# Patient Record
Sex: MALE | Race: WHITE | NOT HISPANIC OR LATINO | Employment: OTHER | URBAN - METROPOLITAN AREA
[De-identification: names, ages, dates, MRNs, and addresses within clinical notes are randomized per-mention and may not be internally consistent; named-entity substitution may affect disease eponyms.]

---

## 2018-04-24 RX ORDER — OXYCODONE AND ACETAMINOPHEN 5; 325 MG/1; MG/1
TABLET ORAL
COMMUNITY
End: 2019-01-08 | Stop reason: SDUPTHER

## 2018-04-24 RX ORDER — CARVEDILOL 6.25 MG/1
TABLET ORAL
COMMUNITY
End: 2019-01-08 | Stop reason: SDUPTHER

## 2018-04-24 RX ORDER — WARFARIN 4 MG/1
TABLET ORAL
COMMUNITY
End: 2019-01-08 | Stop reason: SDUPTHER

## 2018-04-24 RX ORDER — AMIODARONE HYDROCHLORIDE 100 MG/1
TABLET ORAL
COMMUNITY

## 2018-04-24 RX ORDER — AMIODARONE HYDROCHLORIDE 100 MG/1
TABLET ORAL
COMMUNITY
End: 2019-01-08 | Stop reason: SDUPTHER

## 2018-04-24 RX ORDER — ATORVASTATIN CALCIUM 20 MG/1
TABLET, FILM COATED ORAL
COMMUNITY
Start: 2018-03-26

## 2018-04-24 RX ORDER — AMIODARONE HYDROCHLORIDE 400 MG/1
TABLET ORAL
COMMUNITY

## 2018-04-24 RX ORDER — ALLOPURINOL 100 MG/1
TABLET ORAL
COMMUNITY
End: 2019-01-08 | Stop reason: SDUPTHER

## 2018-04-24 RX ORDER — AMIODARONE HYDROCHLORIDE 200 MG/1
TABLET ORAL
COMMUNITY
End: 2019-01-08 | Stop reason: SDUPTHER

## 2018-04-24 RX ORDER — WARFARIN 4 MG/1
TABLET ORAL
COMMUNITY

## 2018-04-24 RX ORDER — CARVEDILOL 12.5 MG/1
TABLET ORAL
COMMUNITY
End: 2019-01-08 | Stop reason: SDUPTHER

## 2018-04-24 RX ORDER — ALBUTEROL SULFATE 90 UG/1
AEROSOL, METERED RESPIRATORY (INHALATION)
COMMUNITY
Start: 2018-04-18

## 2018-04-24 RX ORDER — WARFARIN 1 MG/1
TABLET ORAL
COMMUNITY

## 2018-04-24 RX ORDER — CALCIUM ACETATE 667 MG/1
CAPSULE ORAL
COMMUNITY

## 2018-04-24 RX ORDER — HYDROXYZINE PAMOATE 25 MG/1
CAPSULE ORAL
COMMUNITY
End: 2019-01-08 | Stop reason: SDUPTHER

## 2018-04-24 RX ORDER — BUDESONIDE AND FORMOTEROL FUMARATE DIHYDRATE 160; 4.5 UG/1; UG/1
AEROSOL RESPIRATORY (INHALATION)
COMMUNITY
Start: 2018-03-31

## 2018-04-24 RX ORDER — ATORVASTATIN CALCIUM 40 MG/1
TABLET, FILM COATED ORAL
COMMUNITY
End: 2019-01-08 | Stop reason: SDUPTHER

## 2018-04-24 RX ORDER — WARFARIN SODIUM 5 MG/1
TABLET ORAL
COMMUNITY
End: 2019-01-08 | Stop reason: SDUPTHER

## 2018-04-24 RX ORDER — CARVEDILOL 3.12 MG/1
TABLET ORAL
COMMUNITY

## 2018-04-24 RX ORDER — WARFARIN 2 MG/1
TABLET ORAL
COMMUNITY
End: 2019-01-08 | Stop reason: SDUPTHER

## 2018-04-24 RX ORDER — PANTOPRAZOLE SODIUM 40 MG/1
TABLET, DELAYED RELEASE ORAL
COMMUNITY
End: 2019-01-08 | Stop reason: SDUPTHER

## 2018-04-24 RX ORDER — AZITHROMYCIN 250 MG/1
TABLET, FILM COATED ORAL
COMMUNITY
End: 2019-01-08 | Stop reason: SDUPTHER

## 2018-04-24 RX ORDER — CARVEDILOL 3.12 MG/1
TABLET ORAL
COMMUNITY
End: 2019-01-08 | Stop reason: SDUPTHER

## 2018-04-24 RX ORDER — AMIODARONE HYDROCHLORIDE 200 MG/1
TABLET ORAL
COMMUNITY
Start: 2018-02-03 | End: 2019-01-08 | Stop reason: SDUPTHER

## 2018-04-24 RX ORDER — MIDODRINE HYDROCHLORIDE 10 MG/1
TABLET ORAL
COMMUNITY

## 2018-04-24 RX ORDER — ATORVASTATIN CALCIUM 20 MG/1
TABLET, FILM COATED ORAL
COMMUNITY
End: 2019-01-08 | Stop reason: SDUPTHER

## 2018-04-24 RX ORDER — ALBUTEROL SULFATE 0.83 MG/ML
SOLUTION RESPIRATORY (INHALATION)
COMMUNITY
End: 2019-01-08 | Stop reason: SDUPTHER

## 2018-04-24 RX ORDER — WARFARIN 2.5 MG/1
TABLET ORAL
COMMUNITY

## 2018-04-24 RX ORDER — OXYCODONE AND ACETAMINOPHEN 7.5; 325 MG/1; MG/1
TABLET ORAL
COMMUNITY

## 2018-05-15 ENCOUNTER — OFFICE VISIT (OUTPATIENT)
Dept: PODIATRY | Facility: CLINIC | Age: 68
End: 2018-05-15
Payer: MEDICARE

## 2018-05-15 VITALS
WEIGHT: 177 LBS | HEART RATE: 99 BPM | DIASTOLIC BLOOD PRESSURE: 47 MMHG | SYSTOLIC BLOOD PRESSURE: 102 MMHG | TEMPERATURE: 98 F | HEIGHT: 70 IN | BODY MASS INDEX: 25.34 KG/M2

## 2018-05-15 DIAGNOSIS — D36.10 NEUROMA: ICD-10-CM

## 2018-05-15 DIAGNOSIS — M21.70 ACQUIRED SHORT LEG SYNDROME ON LEFT: ICD-10-CM

## 2018-05-15 DIAGNOSIS — M19.079 DJD (DEGENERATIVE JOINT DISEASE), ANKLE AND FOOT, UNSPECIFIED LATERALITY: Primary | ICD-10-CM

## 2018-05-15 PROCEDURE — 99213 OFFICE O/P EST LOW 20 MIN: CPT | Mod: S$PBB,,, | Performed by: PODIATRIST

## 2018-05-15 PROCEDURE — 99213 OFFICE O/P EST LOW 20 MIN: CPT | Mod: PBBFAC,PN | Performed by: PODIATRIST

## 2018-05-15 PROCEDURE — 99999 PR PBB SHADOW E&M-EST. PATIENT-LVL III: CPT | Mod: PBBFAC,,, | Performed by: PODIATRIST

## 2018-05-19 PROBLEM — M21.70 ACQUIRED SHORT LEG SYNDROME ON LEFT: Status: ACTIVE | Noted: 2018-05-19

## 2018-05-19 PROBLEM — D36.10 NEUROMA: Status: ACTIVE | Noted: 2018-05-19

## 2018-05-19 PROBLEM — M19.079: Status: ACTIVE | Noted: 2018-05-19

## 2018-05-19 NOTE — PROGRESS NOTES
Subjective:       Patient ID: Aditya King is a 67 y.o. male.    Chief Complaint: Follow-up and Nail Problem    HPI The patient presents for followup of diffuse degenerative arthritis as well as severe pes planus bilateral. Patient states the pads that I placed in his shoes have helped quite a bit to have reduced his discomfort he states that he just recently started to use [facility] he's only been using it for a couple days but he thinks it is working. Patient has several ingrowing toenails that have become painful and is concerned about possible infection. patient's family is also concerned because the patient has dry blood under several of the toenails. Patient relates pain in both of his forefeet also. patient states he should have come back sooner but he has been dealing with health related issues.     Review of Systems   Respiratory: Positive for shortness of breath.    Musculoskeletal: Positive for arthralgias, gait problem and joint swelling.       Objective:      Physical Exam   Constitutional: He appears well-developed and well-nourished.   Cardiovascular:   Pulses:       Dorsalis pedis pulses are 1+ on the right side, and 1+ on the left side.        Posterior tibial pulses are 0 on the right side, and 0 on the left side.   Musculoskeletal: He exhibits tenderness and deformity.        Left foot: There is decreased range of motion and deformity.   Feet:   Right Foot:   Protective Sensation: 4 sites tested. 3 sites sensed.   Skin Integrity: Positive for callus and dry skin.   Left Foot:   Protective Sensation: 4 sites tested. 3 sites sensed.   Skin Integrity: Positive for callus and dry skin.   Neurological: He is alert.   Skin: Skin is warm.   Psychiatric: He has a normal mood and affect. His behavior is normal. Judgment and thought content normal.     On evaluation patient is noted to have significant vascular compromise in both lower extremities the patient's feet have a  purplish blue hue when placed in a dependent position bilateral. Patient has a generalized pain in the midfoot area especially surrounding the first MPJ bilateral this area of the first MPJ and first web space is noted to be significantly painful upon palpation there are degenerative changes noted and positive crepitus upon range of motion appreciated. Patient has no skin breaks no active signs of infection noted. Patient displayed severe pes planus deformity bilateral with medial column collapse noted upon weight-bearing bilateral.the patient has several areas of concern with likely ingrowing toenails that are at risk for becoming infected especially the bilateral hallux.patient is noted to have a significant limb length discrepancy the patient's left lower extremity is approximately half an inch shorter than the right.     Assessment:       1. DJD (degenerative joint disease), ankle and foot, unspecified laterality    2. Acquired short leg syndrome on left    3. Neuroma        Plan:       Following evaluation I have added an additional quarter-inch adhesive felt to the plantar aspect of the patient's insole on the left to make up for a limb length discrepancy.patient now has a total half an inch lift on the left side he quarter-inch I placed him there has become compressed I feel the patient having a total half and it should make a considerable difference I did advise the patient this is working well for him I can using more from material to place on the patient's insole possibly corex.patient had ingrown toenails bilaterally for debridement and removed the patient patient noted considerable relief following debridement. Patient was dispensed a new left left sign utilizing a more permanent material. Patient was dispensed a loop was passed to use between the digits primarily the second and third digits bilateral with the patient's been experiencing discomfort and rubbing between the toes. Followup as needed.   Patient dispensed a Korex quarter inch lift in the left shoe.

## 2018-08-16 ENCOUNTER — OFFICE VISIT (OUTPATIENT)
Dept: PODIATRY | Facility: CLINIC | Age: 68
End: 2018-08-16
Payer: MEDICARE

## 2018-08-16 VITALS
HEART RATE: 80 BPM | DIASTOLIC BLOOD PRESSURE: 52 MMHG | BODY MASS INDEX: 23.8 KG/M2 | HEIGHT: 71 IN | SYSTOLIC BLOOD PRESSURE: 111 MMHG | TEMPERATURE: 97 F | WEIGHT: 170 LBS

## 2018-08-16 DIAGNOSIS — L60.0 INGROWN NAIL: ICD-10-CM

## 2018-08-16 DIAGNOSIS — M21.70 ACQUIRED SHORT LEG SYNDROME ON LEFT: ICD-10-CM

## 2018-08-16 DIAGNOSIS — D36.10 NEUROMA: ICD-10-CM

## 2018-08-16 DIAGNOSIS — M19.079 DJD (DEGENERATIVE JOINT DISEASE), ANKLE AND FOOT, UNSPECIFIED LATERALITY: Primary | ICD-10-CM

## 2018-08-16 PROCEDURE — 99213 OFFICE O/P EST LOW 20 MIN: CPT | Mod: PBBFAC,PN | Performed by: PODIATRIST

## 2018-08-16 PROCEDURE — 99999 PR PBB SHADOW E&M-EST. PATIENT-LVL III: CPT | Mod: PBBFAC,,, | Performed by: PODIATRIST

## 2018-08-16 PROCEDURE — 99213 OFFICE O/P EST LOW 20 MIN: CPT | Mod: S$PBB,,, | Performed by: PODIATRIST

## 2018-08-19 PROBLEM — L60.0 INGROWN NAIL: Status: ACTIVE | Noted: 2018-08-19

## 2018-08-19 NOTE — PROGRESS NOTES
Subjective:       Patient ID: Aditya King is a 67 y.o. male.    Chief Complaint: Follow-up and Nail Problem    Nail Problem   Associated symptoms include arthralgias and joint swelling.    The patient presents for followup of diffuse degenerative arthritis as well as severe pes planus bilateral. Patient states the pads that I placed in his shoes have helped quite a bit to have reduced his discomfort he states that he just recently started to use [facility] he's only been using it for a couple days but he thinks it is working. Patient has several ingrowing toenails that have become painful and is concerned about possible infection. patient's family is also concerned because the patient has dry blood under several of the toenails. Patient relates pain in both of his forefeet also. patient states he should have come back sooner but he has been dealing with health related issues.     Review of Systems   Respiratory: Positive for shortness of breath.    Musculoskeletal: Positive for arthralgias, gait problem and joint swelling.       Objective:      Physical Exam   Constitutional: He appears well-developed and well-nourished.   Cardiovascular:   Pulses:       Dorsalis pedis pulses are 1+ on the right side, and 1+ on the left side.        Posterior tibial pulses are 0 on the right side, and 0 on the left side.   Musculoskeletal: He exhibits tenderness and deformity.        Left foot: There is decreased range of motion and deformity.   Feet:   Right Foot:   Protective Sensation: 4 sites tested. 3 sites sensed.   Skin Integrity: Positive for callus and dry skin.   Left Foot:   Protective Sensation: 4 sites tested. 3 sites sensed.   Skin Integrity: Positive for callus and dry skin.   Neurological: He is alert.   Skin: Skin is warm.   Psychiatric: He has a normal mood and affect. His behavior is normal. Judgment and thought content normal.     On evaluation patient is noted to have significant  vascular compromise in both lower extremities the patient's feet have a purplish blue hue when placed in a dependent position bilateral. Patient has a generalized pain in the midfoot area especially surrounding the first MPJ bilateral this area of the first MPJ and first web space is noted to be significantly painful upon palpation there are degenerative changes noted and positive crepitus upon range of motion appreciated. Patient has no skin breaks no active signs of infection noted. Patient displayed severe pes planus deformity bilateral with medial column collapse noted upon weight-bearing bilateral.the patient has several areas of concern with likely ingrowing toenails that are at risk for becoming infected especially the bilateral hallux.patient is noted to have a significant limb length discrepancy the patient's left lower extremity is approximately half an inch shorter than the right.     Assessment:       1. DJD (degenerative joint disease), ankle and foot, unspecified laterality    2. Acquired short leg syndrome on left    3. Neuroma    4. Ingrown nail        Plan:       Following evaluation I have added an additional quarter-inch adhesive felt to the plantar aspect of the patient's insole on the left to make up for a limb length discrepancy.patient now has a total half an inch lift on the left side he quarter-inch I placed him there has become compressed I feel the patient having a total half and it should make a considerable difference I did advise the patient this is working well for him I can using more from material to place on the patient's insole possibly corex.patient had ingrown toenails bilaterally for debridement and removed the patient patient noted considerable relief following debridement. Patient was dispensed a new left left sign utilizing a more permanent material. Patient was dispensed a loop was passed to use between the digits primarily the second and third digits bilateral with the  patient's been experiencing discomfort and rubbing between the toes. Followup as needed.  Patient dispensed a Korex quarter inch lift in the left shoe.   Patient had a significantly ingrown toenail medial lateral border left hallux this was carefully debrided today which gave the patient considerable relief this needs to be monitored very closely the patient is on dialysis and is at risk for complication due to infection.

## 2019-01-08 ENCOUNTER — OFFICE VISIT (OUTPATIENT)
Dept: PODIATRY | Facility: CLINIC | Age: 69
End: 2019-01-08
Payer: MEDICARE

## 2019-01-08 VITALS
TEMPERATURE: 98 F | HEART RATE: 80 BPM | HEIGHT: 71 IN | WEIGHT: 170 LBS | SYSTOLIC BLOOD PRESSURE: 99 MMHG | BODY MASS INDEX: 23.8 KG/M2 | DIASTOLIC BLOOD PRESSURE: 53 MMHG

## 2019-01-08 DIAGNOSIS — M20.41 HAMMER TOE OF RIGHT FOOT: ICD-10-CM

## 2019-01-08 DIAGNOSIS — L60.0 INGROWN NAIL: ICD-10-CM

## 2019-01-08 DIAGNOSIS — M19.079 DJD (DEGENERATIVE JOINT DISEASE), ANKLE AND FOOT, UNSPECIFIED LATERALITY: Primary | ICD-10-CM

## 2019-01-08 PROCEDURE — 99213 PR OFFICE/OUTPT VISIT, EST, LEVL III, 20-29 MIN: ICD-10-PCS | Mod: S$PBB,,, | Performed by: PODIATRIST

## 2019-01-08 PROCEDURE — 99213 OFFICE O/P EST LOW 20 MIN: CPT | Mod: S$PBB,,, | Performed by: PODIATRIST

## 2019-01-08 PROCEDURE — 99213 OFFICE O/P EST LOW 20 MIN: CPT | Mod: PBBFAC,PN | Performed by: PODIATRIST

## 2019-01-08 PROCEDURE — 99999 PR PBB SHADOW E&M-EST. PATIENT-LVL III: CPT | Mod: PBBFAC,,, | Performed by: PODIATRIST

## 2019-01-08 PROCEDURE — 99999 PR PBB SHADOW E&M-EST. PATIENT-LVL III: ICD-10-PCS | Mod: PBBFAC,,, | Performed by: PODIATRIST

## 2019-01-08 RX ORDER — LEVOFLOXACIN 750 MG/1
TABLET ORAL
Refills: 0 | COMMUNITY
Start: 2019-01-05 | End: 2019-01-08 | Stop reason: SDUPTHER

## 2019-01-08 RX ORDER — ALBUTEROL SULFATE 90 UG/1
AEROSOL, METERED RESPIRATORY (INHALATION)
COMMUNITY

## 2019-01-08 RX ORDER — PREDNISONE 20 MG/1
TABLET ORAL
COMMUNITY
Start: 2019-01-08

## 2019-01-12 PROBLEM — M20.41 HAMMER TOE OF RIGHT FOOT: Status: ACTIVE | Noted: 2019-01-12

## 2019-01-13 NOTE — PROGRESS NOTES
Subjective:       Patient ID: Aditya King is a 68 y.o. male.    Chief Complaint: Follow-up and Nail Problem    Nail Problem   Associated symptoms include arthralgias and joint swelling.    The patient presents for followup of diffuse degenerative arthritis as well as severe pes planus bilateral.  Patient has several ingrowing toenails that have become painful and is concerned about possible infection. patient's family is also concerned because the patient has dry blood under several of the toenails. Patient relates pain in both of his forefeet also. patient states he should have come back sooner but he has been dealing with health related issues.     Review of Systems   Respiratory: Positive for shortness of breath.    Musculoskeletal: Positive for arthralgias, gait problem and joint swelling.       Objective:      Physical Exam   Constitutional: He appears well-developed and well-nourished.   Cardiovascular:   Pulses:       Dorsalis pedis pulses are 1+ on the right side, and 1+ on the left side.        Posterior tibial pulses are 0 on the right side, and 0 on the left side.   Musculoskeletal: He exhibits tenderness and deformity.        Left foot: There is decreased range of motion and deformity.   Feet:   Right Foot:   Protective Sensation: 4 sites tested. 3 sites sensed.   Skin Integrity: Positive for callus and dry skin.   Left Foot:   Protective Sensation: 4 sites tested. 3 sites sensed.   Skin Integrity: Positive for callus and dry skin.   Neurological: He is alert.   Skin: Skin is warm.   Psychiatric: He has a normal mood and affect. His behavior is normal. Judgment and thought content normal.     On evaluation patient is noted to have significant vascular compromise in both lower extremities the patient's feet have a purplish blue hue when placed in a dependent position bilateral. Patient has a generalized pain in the midfoot area especially surrounding the first MPJ bilateral this area of the first MPJ  and first web space is noted to be significantly painful upon palpation there are degenerative changes noted and positive crepitus upon range of motion appreciated. Patient has no skin breaks no active signs of infection noted. Patient displayed severe pes planus deformity bilateral with medial column collapse noted upon weight-bearing bilateral.the patient has several areas of concern with likely ingrowing toenails that are at risk for becoming infected especially the bilateral hallux.patient is noted to have a significant limb length discrepancy the patient's left lower extremity is approximately half an inch shorter than the right.     Assessment:       1. DJD (degenerative joint disease), ankle and foot, unspecified laterality    2. Hammer toe of right foot    3. Ingrown nail        Plan:       Following evaluation I have added an additional quarter-inch adhesive felt to the plantar aspect of the patient's insole on the left to make up for a limb length discrepancy.patient now has a total half an inch lift on the left side he quarter-inch I placed him there has become compressed I feel the patient having a total half and it should make a considerable difference I did advise the patient this is working well for him I can using more from material to place on the patient's insole possibly corex.patient had ingrown toenails bilaterally for debridement and removed the patient patient noted considerable relief following debridement. Patient was dispensed a new left left sign utilizing a more permanent material. Patient was dispensed a loop was passed to use between the digits primarily the second and third digits bilateral with the patient's been experiencing discomfort and rubbing between the toes. Followup as needed.  Patient dispensed a Korex quarter inch lift in the left shoe.   Patient had a significantly ingrown toenail medial lateral border left hallux this was carefully debrided today which gave the patient  considerable relief this needs to be monitored very closely the patient is on dialysis and is at risk for complication due to infection.  Patient has been having increased pain in digits 2 through 5 right foot secondary to digital contractures I have advised the patient he needs to make sure that he wear shoes that accommodate for the hammertoe deformities that are rigid in nature non reducible certainly I am not recommending any surgical intervention with the patient's history of renal failure.  I did discuss with the patient appropriate shoes that he can wear to accommodate for these deformities.

## 2019-08-20 ENCOUNTER — OFFICE VISIT (OUTPATIENT)
Dept: PODIATRY | Facility: CLINIC | Age: 69
End: 2019-08-20
Payer: MEDICARE

## 2019-08-20 VITALS
WEIGHT: 170 LBS | HEIGHT: 71 IN | SYSTOLIC BLOOD PRESSURE: 75 MMHG | HEART RATE: 103 BPM | TEMPERATURE: 98 F | BODY MASS INDEX: 23.8 KG/M2 | DIASTOLIC BLOOD PRESSURE: 55 MMHG

## 2019-08-20 DIAGNOSIS — M21.70 ACQUIRED SHORT LEG SYNDROME ON LEFT: ICD-10-CM

## 2019-08-20 DIAGNOSIS — M20.41 HAMMER TOE OF RIGHT FOOT: ICD-10-CM

## 2019-08-20 DIAGNOSIS — D36.10 NEUROMA: ICD-10-CM

## 2019-08-20 DIAGNOSIS — M19.079 DJD (DEGENERATIVE JOINT DISEASE), ANKLE AND FOOT, UNSPECIFIED LATERALITY: ICD-10-CM

## 2019-08-20 DIAGNOSIS — R60.0 PERIPHERAL EDEMA: ICD-10-CM

## 2019-08-20 DIAGNOSIS — L60.0 INGROWN NAIL: Primary | ICD-10-CM

## 2019-08-20 DIAGNOSIS — I73.9 PERIPHERAL VASCULAR DISEASE: ICD-10-CM

## 2019-08-20 PROCEDURE — 99213 PR OFFICE/OUTPT VISIT, EST, LEVL III, 20-29 MIN: ICD-10-PCS | Mod: S$PBB,,, | Performed by: PODIATRIST

## 2019-08-20 PROCEDURE — 99999 PR PBB SHADOW E&M-EST. PATIENT-LVL III: CPT | Mod: PBBFAC,,, | Performed by: PODIATRIST

## 2019-08-20 PROCEDURE — 99999 PR PBB SHADOW E&M-EST. PATIENT-LVL III: ICD-10-PCS | Mod: PBBFAC,,, | Performed by: PODIATRIST

## 2019-08-20 PROCEDURE — 99213 OFFICE O/P EST LOW 20 MIN: CPT | Mod: PBBFAC,PN | Performed by: PODIATRIST

## 2019-08-20 PROCEDURE — 99213 OFFICE O/P EST LOW 20 MIN: CPT | Mod: S$PBB,,, | Performed by: PODIATRIST

## 2019-08-20 RX ORDER — FLUTICASONE FUROATE AND VILANTEROL TRIFENATATE 100; 25 UG/1; UG/1
POWDER RESPIRATORY (INHALATION)
Refills: 0 | COMMUNITY
Start: 2019-07-19

## 2019-08-20 RX ORDER — METOPROLOL SUCCINATE 50 MG/1
TABLET, EXTENDED RELEASE ORAL
Refills: 0 | COMMUNITY
Start: 2019-08-01

## 2019-08-20 RX ORDER — FAMOTIDINE 20 MG/1
TABLET, FILM COATED ORAL
Refills: 0 | COMMUNITY
Start: 2019-07-19

## 2019-08-20 NOTE — LETTER
August 24, 2019      Mone Brenner MD  [address]  [place] [address]           [facility] - Podiatry/Wound Care  [facility] [facility] [place] [address]  Phone: [phone]  Fax: [phone]          Patient: Aditya King   MR Number: [id number]   YOB: 1950   Date of Visit: 8/20/2019       Dear Dr. Mone Brenner:    Thank you for referring Aditya King to me for evaluation. Attached you will find relevant portions of my assessment and plan of care.    If you have questions, please do not hesitate to call me. I look forward to following Aditya King along with you.    Sincerely,    Gen Arroyo, DPSHELL    Enclosure  CC:  No Recipients    If you would like to receive this communication electronically, please contact [email or web address] or [phone] to request more information on [facility] access.    For providers and/or their staff who would like to refer a patient to [facility], please contact us through our one-stop-shop provider referral line, [facility], at [phone].    If you feel you have received this communication in error or would no longer like to receive these types of communications, please e-mail [email or web address]

## 2019-08-24 PROBLEM — I73.9 PERIPHERAL VASCULAR DISEASE: Status: ACTIVE | Noted: 2019-08-24

## 2019-08-24 NOTE — PROGRESS NOTES
Subjective:       Patient ID: Aditya King is a 68 y.o. male.    Chief Complaint: Follow-up and Nail Problem    Nail Problem   Associated symptoms include arthralgias and joint swelling.    The patient presents for followup of diffuse degenerative arthritis as well as severe pes planus bilateral.  Patient has several ingrowing toenails that have become painful and is concerned about possible infection. patient's family is also concerned because the patient has dry blood under several of the toenails. Patient relates pain in both of his forefeet also. patient states he should have come back sooner but he has been dealing with health related issues.     Review of Systems   Respiratory: Positive for shortness of breath.    Musculoskeletal: Positive for arthralgias, gait problem and joint swelling.       Objective:      Physical Exam   Constitutional: He appears well-developed and well-nourished.   Cardiovascular:   Pulses:       Dorsalis pedis pulses are 1+ on the right side, and 1+ on the left side.        Posterior tibial pulses are 0 on the right side, and 0 on the left side.   Musculoskeletal: He exhibits tenderness and deformity.        Left foot: There is decreased range of motion and deformity.   Feet:   Right Foot:   Protective Sensation: 4 sites tested. 3 sites sensed.   Skin Integrity: Positive for callus and dry skin.   Left Foot:   Protective Sensation: 4 sites tested. 3 sites sensed.   Skin Integrity: Positive for callus and dry skin.   Neurological: He is alert.   Skin: Skin is warm.   Psychiatric: He has a normal mood and affect. His behavior is normal. Judgment and thought content normal.     On evaluation patient is noted to have significant vascular compromise in both lower extremities the patient's feet have a purplish blue hue when placed in a dependent position bilateral. Patient has a generalized pain in the midfoot area especially surrounding the first MPJ bilateral this area of the first MPJ  and first web space is noted to be significantly painful upon palpation there are degenerative changes noted and positive crepitus upon range of motion appreciated. Patient has no skin breaks no active signs of infection noted. Patient displayed severe pes planus deformity bilateral with medial column collapse noted upon weight-bearing bilateral.the patient has several areas of concern with likely ingrowing toenails that are at risk for becoming infected especially the bilateral hallux.patient is noted to have a significant limb length discrepancy the patient's left lower extremity is approximately half an inch shorter than the right.     Assessment:       1. Ingrown nail    2. Acquired short leg syndrome on left    3. Peripheral edema    4. DJD (degenerative joint disease), ankle and foot, unspecified laterality    5. Hammer toe of right foot    6. Neuroma    7. Peripheral vascular disease        Plan:       Following evaluation I have added an additional quarter-inch adhesive felt to the plantar aspect of the patient's insole on the left to make up for a limb length discrepancy.patient now has a total half an inch lift on the left side he quarter-inch I placed him there has become compressed I feel the patient having a total half and it should make a considerable difference I did advise the patient this is working well for him I can using more from material to place on the patient's insole possibly corex.patient had ingrown toenails bilaterally for debridement and removed the patient patient noted considerable relief following debridement. Patient was dispensed a new left left sign utilizing a more permanent material. Patient was dispensed a loop was passed to use between the digits primarily the second and third digits bilateral with the patient's been experiencing discomfort and rubbing between the toes. Followup as needed.  Patient dispensed a [employer]x quarter inch lift in the left shoe.   Patient had a  significantly ingrown toenail medial lateral border left hallux this was carefully debrided today which gave the patient considerable relief this needs to be monitored very closely the patient is on dialysis and is at risk for complication due to infection.  Patient has been having increased pain in digits 2 through 5 right foot secondary to digital contractures I have advised the patient he needs to make sure that he wear shoes that accommodate for the hammertoe deformities that are rigid in nature non reducible certainly I am not recommending any surgical intervention with the patient's history of renal failure.  I did discuss with the patient appropriate shoes that he can wear to accommodate for these deformities. Patient indicated he has been in and out of the hospital 7 times since I saw him last in January with a multitude of complicated health problems.This note was created using Stack[employer] voice recognition software that occasionally misinterpreted phrases or words.